# Patient Record
(demographics unavailable — no encounter records)

---

## 2024-11-23 NOTE — ASSESSMENT
[FreeTextEntry1] : 70-year-old male for evaluation status post left shoulder injury.  Patient reports he fell and landed directly on his left shoulder.  Seen in the ER x-rays taken from no acute fractures.  Denies any numbness or tingling.  Denies any stability.  Physical examination left shoulder: No swelling, ecchymosis, erythema appreciated.  Skin is intact.  No midline neck or paraspinal tenderness.  Mild tenderness of the anterior glenohumeral joint and lateral deltoid.  Good range of motion upon flexion and abduction of the left shoulder without any limitations.  4-5 strength.  Negative Banuelos.  Negative Hampshire's.  Negative drop arm testing.  Sensorimotor intact distally.  Neurovascularly intact.   X-rays of the shoulder reviewed from the ER: No acute fractures, subluxations, or dislocations.  Treatment plan as discussed:  My clinical suspicion is high for a contusion of the left shoulder given the patient's history, physical examination findings, and x-ray findings.   I recommended anti-inflammatory medication. Patient agrees to taking Advil/Ibuprofen OTC as needed for pain. Benefits discussed. Confirmed no contraindication to NSAIDs.   Script for physical therapy offered however, he declined.   I recommended patient rest, ice, compress, and elevate the arm regularly. Encouraged activity modification as tolerable. Encouraged gentle range of motion to avoid stiffness.   All questions and concerns addressed to patient's satisfaction. Patient expresses full understanding of treatment plan. Patient will follow up with [facility] four weeks for further evaluation and treatment. will consider MRI in repeat evaluation if patient's symptoms do not improve.